# Patient Record
Sex: FEMALE | Race: WHITE
[De-identification: names, ages, dates, MRNs, and addresses within clinical notes are randomized per-mention and may not be internally consistent; named-entity substitution may affect disease eponyms.]

---

## 2019-09-04 LAB
ALBUMIN SERPL-MCNC: 3.8 G/DL (ref 3.5–5)
ALBUMIN/GLOB SERPL: 0.9 {RATIO} (ref 0.8–2)
ALP SERPL-CCNC: 59 IU/L (ref 40–150)
ALT SERPL-CCNC: 16 IU/L (ref 0–55)
ANION GAP SERPL CALC-SCNC: 16.4 MMOL/L (ref 8–16)
BASOPHILS # BLD AUTO: 0.1 10*3/UL (ref 0–0.1)
BASOPHILS NFR BLD AUTO: 0.7 % (ref 0–1)
BUN SERPL-MCNC: 13 MG/DL (ref 7–26)
BUN/CREAT SERPL: 19 (ref 6–25)
CALCIUM SERPL-MCNC: 9.6 MG/DL (ref 8.4–10.2)
CHLORIDE SERPL-SCNC: 100 MMOL/L (ref 98–107)
CO2 SERPL-SCNC: 20 MMOL/L (ref 22–29)
DEPRECATED NEUTROPHILS # BLD AUTO: 5 10*3/UL (ref 2.1–6.9)
EGFRCR SERPLBLD CKD-EPI 2021: > 60 ML/MIN (ref 60–?)
EOSINOPHIL # BLD AUTO: 0.1 10*3/UL (ref 0–0.4)
EOSINOPHIL NFR BLD AUTO: 1.3 % (ref 0–6)
ERYTHROCYTE [DISTWIDTH] IN CORD BLOOD: 14.5 % (ref 11.7–14.4)
GLOBULIN PLAS-MCNC: 4.2 G/DL (ref 2.3–3.5)
GLUCOSE SERPLBLD-MCNC: 81 MG/DL (ref 74–118)
HCT VFR BLD AUTO: 45.3 % (ref 34.2–44.1)
HGB BLD-MCNC: 15.8 G/DL (ref 12–16)
LYMPHOCYTES # BLD: 2.8 10*3/UL (ref 1–3.2)
LYMPHOCYTES NFR BLD AUTO: 32.7 % (ref 18–39.1)
MCH RBC QN AUTO: 34.3 PG (ref 28–32)
MCHC RBC AUTO-ENTMCNC: 34.9 G/DL (ref 31–35)
MCV RBC AUTO: 98.3 FL (ref 81–99)
MONOCYTES # BLD AUTO: 0.7 10*3/UL (ref 0.2–0.8)
MONOCYTES NFR BLD AUTO: 7.5 % (ref 4.4–11.3)
NEUTS SEG NFR BLD AUTO: 57.6 % (ref 38.7–80)
PLATELET # BLD AUTO: 192 X10E3/UL (ref 140–360)
POTASSIUM SERPL-SCNC: 3.4 MMOL/L (ref 3.5–5.1)
RBC # BLD AUTO: 4.61 X10E6/UL (ref 3.6–5.1)
SODIUM SERPL-SCNC: 133 MMOL/L (ref 136–145)

## 2019-09-06 ENCOUNTER — HOSPITAL ENCOUNTER (OUTPATIENT)
Dept: HOSPITAL 88 - CATH LAB | Age: 64
Discharge: HOME | End: 2019-09-06
Attending: INTERNAL MEDICINE
Payer: COMMERCIAL

## 2019-09-06 VITALS — SYSTOLIC BLOOD PRESSURE: 178 MMHG | DIASTOLIC BLOOD PRESSURE: 88 MMHG

## 2019-09-06 VITALS — HEIGHT: 62 IN | BODY MASS INDEX: 23.92 KG/M2 | WEIGHT: 130 LBS

## 2019-09-06 VITALS — SYSTOLIC BLOOD PRESSURE: 170 MMHG | DIASTOLIC BLOOD PRESSURE: 64 MMHG

## 2019-09-06 VITALS — DIASTOLIC BLOOD PRESSURE: 82 MMHG | SYSTOLIC BLOOD PRESSURE: 164 MMHG

## 2019-09-06 VITALS — DIASTOLIC BLOOD PRESSURE: 71 MMHG | SYSTOLIC BLOOD PRESSURE: 176 MMHG

## 2019-09-06 VITALS — DIASTOLIC BLOOD PRESSURE: 78 MMHG | SYSTOLIC BLOOD PRESSURE: 118 MMHG

## 2019-09-06 DIAGNOSIS — F17.210: ICD-10-CM

## 2019-09-06 DIAGNOSIS — I70.212: Primary | ICD-10-CM

## 2019-09-06 DIAGNOSIS — I10: ICD-10-CM

## 2019-09-06 DIAGNOSIS — Z01.812: ICD-10-CM

## 2019-09-06 PROCEDURE — 37229: CPT

## 2019-09-06 PROCEDURE — 85025 COMPLETE CBC W/AUTO DIFF WBC: CPT

## 2019-09-06 PROCEDURE — 75625 CONTRAST EXAM ABDOMINL AORTA: CPT

## 2019-09-06 PROCEDURE — 36247 INS CATH ABD/L-EXT ART 3RD: CPT

## 2019-09-06 PROCEDURE — 37186 SEC ART THROMBECTOMY ADD-ON: CPT

## 2019-09-06 PROCEDURE — 36415 COLL VENOUS BLD VENIPUNCTURE: CPT

## 2019-09-06 PROCEDURE — 75716 ARTERY X-RAYS ARMS/LEGS: CPT

## 2019-09-06 PROCEDURE — 37232: CPT

## 2019-09-06 PROCEDURE — 80053 COMPREHEN METABOLIC PANEL: CPT

## 2019-09-06 PROCEDURE — 37227: CPT

## 2019-09-06 PROCEDURE — 37252 INTRVASC US NONCORONARY 1ST: CPT

## 2019-09-06 NOTE — NUR
1745 pt becoming increasingly eager for rapid discharge. Cath lab supervisor Althea Bucio RN 
at bedside and pt prepped for dc. Yue arrived in [facility] pt refused w/c escort to front 
[facility]. No gross issues of pain,pallor,pressure or dysrhythmia. Rt Mynx closure site intact 
w/o noted hematoma or oozing.Pt was given copies of papers and home prescriptions and meds. 
She was explained meds and importance of f/o care with Dr Pascal 2wks.Then, patient was 
escorted to [facility] ambulatory. Iv was removed and no s/s infiltration Coban dressing in 
place. Pt back to apparent baseline orientation .Copies of dc paper in belonging bag. belinda/rn

## 2019-09-06 NOTE — NUR
4622 Bedside report received from Michael JOYNER. Alert oriented and inappropriate and agitative 
periodically, PERRLA, respirations even and unlabored to room air. Pulses x2 upper 
extremity.  Pedal pulses PT/DP palpable . Cap fill brisk < 3 sec. 



Skin warm and dry integrity appears D/I. IV 20g to left hand,Infusing at 100cchr, presents 
healthy w/o s/s of infiltration or complaint. Abdomen soft and supple. pt offered toileting, 
denies need to urinate or defecate. No personal affects with patient.  no Family. Pt and 
family verbalizes agitation of having to stay flat and down till 6pm. Pt voices no release 
of information and ride [facility] Clark will pickup with no information release. 
Reached out per phone call to [phone] by Michael JOYNER sedation nurse and will arrive at 6pm.



Currently w/o complaint of pain or need. Rt Mynx closure site w/o s/s hematoma no gross 
issues pain, pallor pressure or dysrhythmia.  Attempted instruction of home care to pt since 
refused info to ride. Pt refused to completely fill out discharge papers due to apparent 
agitative state. MD and supervision staff aware. belinda/rn

## 2019-09-10 NOTE — OPERATIVE REPORT
DATE OF PROCEDURE:  09/06/2019

 

SURGEON:  Feliz Pascal MD

 

INDICATIONS:  Peripheral arterial disease, claudication of the left lower extremity.

 

PROCEDURES PERFORMED:  

1. Abdominal aortogram.

2. Selective catheter placement of the right femoral artery to the left superficial

femoral artery. 

3. Additional third-order catheter placement of the right femoral artery to the left

anterior tibial artery. 

4. Atherectomy, angioplasty of the left femoral artery.

5. Secondary thrombectomy of the left femoral artery.

6. Stent placement to the left femoral artery.

7. Atherectomy and angioplasty of the left anterior tibial artery.

8. Atherectomy and angioplasty of left peroneal artery.

9. Deployment of right groin Mynx closure device.

 

COMPLICATIONS:  None.

 

RECOMMENDATIONS:  Staged left posterior tibial intervention via left renal artery.

 

DESCRIPTION OF PROCEDURE:  Access obtained in the right femoral artery.  Abdominal

aortogram demonstrated mild disease in the abdominal aorta and iliacs bilaterally.  The

femoral artery appeared to be occluded and was not well visualized.  The catheter then

advanced in the right femoral artery, left superficial femoral artery with complete

occlusion.  The left femoral artery was noted, right femoral artery diffuse 50%

stenosis.  Infrapopliteal vessels were not well visualized. 

 

The catheter was then advanced in the right femoral artery, right anterior tibial artery

with diffuse 90% stenosis.  The left anterior tibial artery 90% stenosis of the

tibioperoneal trunk as well as complete occlusion of the left posterior tibial artery

was noted. 

 

A decision was made to intervene on the left anterior tibial, peroneal and femoral

artery.  The patient received heparin for anticoagulation.  The sheath was exchanged to

a 6-Thai 45 cm sheath.  The lesions were crossed using a Glidewire.  Orbital

atherectomy of the left posterior tibial artery as well as peroneal arteries were

performed, postdilatation with 4 mm and 3 mm balloon respectively.  Orbital atherectomy

of the left femoral artery was then performed with large amounts of visible thrombus for

which manual aspiration thrombectomy was needed.  A single stent 6 x 80 mm deployed,

post dilated with a 6 mm drug-coated balloon with excellent end result, two vessel

runoff to the left foot.  Right groin repaired using Mynx closure device.  The patient

was discharged home same day. 

 

 

 

 

______________________________

MD MAGY Haas/MODL

D:  09/10/2019 13:19:27

T:  09/10/2019 13:46:09

Job #:  [id number]

## 2019-11-11 LAB
ALBUMIN SERPL-MCNC: 3.8 G/DL (ref 3.5–5)
ALBUMIN/GLOB SERPL: 0.8 {RATIO} (ref 0.8–2)
ALP SERPL-CCNC: 85 IU/L (ref 40–150)
ALT SERPL-CCNC: 22 IU/L (ref 0–55)
ANION GAP SERPL CALC-SCNC: 12.6 MMOL/L (ref 8–16)
BASOPHILS # BLD AUTO: 0.1 10*3/UL (ref 0–0.1)
BASOPHILS NFR BLD AUTO: 0.5 % (ref 0–1)
BUN SERPL-MCNC: 11 MG/DL (ref 7–26)
BUN/CREAT SERPL: 17 (ref 6–25)
CALCIUM SERPL-MCNC: 9.5 MG/DL (ref 8.4–10.2)
CHLORIDE SERPL-SCNC: 101 MMOL/L (ref 98–107)
CO2 SERPL-SCNC: 27 MMOL/L (ref 22–29)
DEPRECATED NEUTROPHILS # BLD AUTO: 6.4 10*3/UL (ref 2.1–6.9)
EGFRCR SERPLBLD CKD-EPI 2021: > 60 ML/MIN (ref 60–?)
EOSINOPHIL # BLD AUTO: 0.1 10*3/UL (ref 0–0.4)
EOSINOPHIL NFR BLD AUTO: 1.1 % (ref 0–6)
ERYTHROCYTE [DISTWIDTH] IN CORD BLOOD: 13.5 % (ref 11.7–14.4)
GLOBULIN PLAS-MCNC: 4.6 G/DL (ref 2.3–3.5)
GLUCOSE SERPLBLD-MCNC: 91 MG/DL (ref 74–118)
HCT VFR BLD AUTO: 43.6 % (ref 34.2–44.1)
HGB BLD-MCNC: 14.9 G/DL (ref 12–16)
LYMPHOCYTES # BLD: 2.4 10*3/UL (ref 1–3.2)
LYMPHOCYTES NFR BLD AUTO: 25.4 % (ref 18–39.1)
MCH RBC QN AUTO: 34.1 PG (ref 28–32)
MCHC RBC AUTO-ENTMCNC: 34.2 G/DL (ref 31–35)
MCV RBC AUTO: 99.8 FL (ref 81–99)
MONOCYTES # BLD AUTO: 0.5 10*3/UL (ref 0.2–0.8)
MONOCYTES NFR BLD AUTO: 5.3 % (ref 4.4–11.3)
NEUTS SEG NFR BLD AUTO: 67.4 % (ref 38.7–80)
PLATELET # BLD AUTO: 243 X10E3/UL (ref 140–360)
POTASSIUM SERPL-SCNC: 3.6 MMOL/L (ref 3.5–5.1)
RBC # BLD AUTO: 4.37 X10E6/UL (ref 3.6–5.1)
SODIUM SERPL-SCNC: 137 MMOL/L (ref 136–145)

## 2019-11-12 ENCOUNTER — HOSPITAL ENCOUNTER (OUTPATIENT)
Dept: HOSPITAL 88 - CATH LAB | Age: 64
Discharge: HOME | End: 2019-11-12
Attending: INTERNAL MEDICINE
Payer: MEDICARE

## 2019-11-12 VITALS — SYSTOLIC BLOOD PRESSURE: 117 MMHG | DIASTOLIC BLOOD PRESSURE: 80 MMHG

## 2019-11-12 VITALS — DIASTOLIC BLOOD PRESSURE: 87 MMHG | SYSTOLIC BLOOD PRESSURE: 108 MMHG

## 2019-11-12 VITALS — SYSTOLIC BLOOD PRESSURE: 141 MMHG | DIASTOLIC BLOOD PRESSURE: 80 MMHG

## 2019-11-12 VITALS — DIASTOLIC BLOOD PRESSURE: 72 MMHG | SYSTOLIC BLOOD PRESSURE: 124 MMHG

## 2019-11-12 VITALS — DIASTOLIC BLOOD PRESSURE: 77 MMHG | SYSTOLIC BLOOD PRESSURE: 127 MMHG

## 2019-11-12 VITALS — SYSTOLIC BLOOD PRESSURE: 117 MMHG | DIASTOLIC BLOOD PRESSURE: 82 MMHG

## 2019-11-12 VITALS — SYSTOLIC BLOOD PRESSURE: 99 MMHG | DIASTOLIC BLOOD PRESSURE: 66 MMHG

## 2019-11-12 VITALS — SYSTOLIC BLOOD PRESSURE: 93 MMHG | DIASTOLIC BLOOD PRESSURE: 60 MMHG

## 2019-11-12 VITALS — DIASTOLIC BLOOD PRESSURE: 77 MMHG | SYSTOLIC BLOOD PRESSURE: 117 MMHG

## 2019-11-12 VITALS — BODY MASS INDEX: 24.11 KG/M2 | HEIGHT: 62 IN | WEIGHT: 131 LBS

## 2019-11-12 DIAGNOSIS — I70.249: Primary | ICD-10-CM

## 2019-11-12 DIAGNOSIS — Z01.812: ICD-10-CM

## 2019-11-12 DIAGNOSIS — F17.200: ICD-10-CM

## 2019-11-12 PROCEDURE — 76937 US GUIDE VASCULAR ACCESS: CPT

## 2019-11-12 PROCEDURE — 36247 INS CATH ABD/L-EXT ART 3RD: CPT

## 2019-11-12 PROCEDURE — 37186 SEC ART THROMBECTOMY ADD-ON: CPT

## 2019-11-12 PROCEDURE — 99153 MOD SED SAME PHYS/QHP EA: CPT

## 2019-11-12 PROCEDURE — 85025 COMPLETE CBC W/AUTO DIFF WBC: CPT

## 2019-11-12 PROCEDURE — 75710 ARTERY X-RAYS ARM/LEG: CPT

## 2019-11-12 PROCEDURE — 36415 COLL VENOUS BLD VENIPUNCTURE: CPT

## 2019-11-12 PROCEDURE — 80053 COMPREHEN METABOLIC PANEL: CPT

## 2019-11-12 PROCEDURE — 37229: CPT

## 2019-11-12 PROCEDURE — 99152 MOD SED SAME PHYS/QHP 5/>YRS: CPT

## 2019-11-12 NOTE — OPERATIVE REPORT
DATE OF PROCEDURE:  11/12/2019

 

SURGEON:  Feliz Pascal MD

 

INDICATIONS:  Peripheral arterial disease staged intervention on the left posterior

tibial artery. 

 

COMPLICATIONS:  None.

 

PROCEDURES PERFORMED:  

1. Ultrasound-guided access in the left posterior tibial artery with unilateral

extremity angiogram and third-order catheter placement from the left pedal artery to the

left superficial femoral artery. 

2. Atherectomy and angioplasty of left posterior tibial artery.

3. Secondary thrombectomy of left posterior tibial artery.

4. Deployment left ankle TR band.

 

RECOMMENDATIONS:  Dual antiplatelet therapy.

 

DESCRIPTION OF PROCEDURE:  Ultrasound-guided access obtained in the left posterior

tibial artery.  A 6-Somali sheath was placed.  Intravenous heparin was administered for

anticoagulation.  The lesion was crossed using a ViperWire.  Orbital atherectomy using a

Diamondback CSI device was performed.  Large amounts of visible thrombus for which

manual aspiration thrombectomy were needed.  A 2.5 mm balloon was used for angioplasty.

Excellent 

end result.  Three-vessel runoff to the left foot.  No complications.  Right ankle TR

band applied.  Sheath removed.  The patient discharged home the same day. 

 

 

 

 

______________________________

Feliz Pascal MD

 

KSB/MODL

D:  11/12/2019 11:08:09

T:  11/12/2019 13:04:19

Job #:  [id number]

## 2019-11-12 NOTE — NUR
1050 Bedside report received from Michael JOYNER. Alert oriented and appropriate, PERRLA, 
respirations even and unlabored to room air. Pulses x4 extremities equal and strong. Pedal 
pulses PT/DP X4 and marked. Cap fill brisk < 3 sec. 



Skin warm and dry integrity appears D/I IV 20g to left hand,  presents healthy w/o s/s of 
infiltration or complaint. Abdomen soft and supple. pt offered toileting, denies need to 
urinate or defecate. No personal affects with patient.  Family XXXXX. Pt and family 
verbalizes understanding of POC.



Currently w/o complaint of pain or need. Ok to decrease TR band at 1230 and dc home after 
that.ds/rn

## 2019-11-12 NOTE — NUR
1315t meets DC criteria. left pedal TR band site assessed for s/s of complication and 
presence of hematoma. XXXXX warm, dry, no discolor, and pulses present. IV removed from left 
hand. Distal tip appears intact. VS WNL. Pt denies pain, sob, or need at this time. No 
Family, friend arrived. Review of discharge paperwork and follow up instructions. 
verbalized understanding. Pt to wheelchair and transported to front of hospital. Transferred 
to private vehicle under own strength w/o incident with DC paperwork in hand. - ds/rn

## 2019-11-12 NOTE — NUR
1230

-------------------------------------------------------------------------------

Addendum: 11/12/19 at 2041 by Jojo Green RN

-------------------------------------------------------------------------------

Pedal left Compression removal:     Initial  Cuff  volume  14    cc

        1230 -2cc  Removed  No hematoma/bleeding noted with normal  neurovascular function.



         1245  -5 cc Removed  No hematoma/ bleeding noted with normal  neurovascular 
function.



         1300  -5cc Removed  No hematoma/bleeding noted with normal  neurovascular function.



              cc Removed  No hematoma/ bleeding noted with normal  neurovascular function.



Air removal completed. 

Stasis achieved sterile 2x2,Tegaderm,  Coban  dressing  No hematoma, bleeding noted with 
normal neurovascular function. Wrist splint in place. Pt instructed on POC.

Ds/Rn

## 2019-11-12 NOTE — XMS REPORT
Patient Summary Document

                             Created on: 2019



Tiny Hernandez

External Reference #: [id number]

: 1955

Sex: Female



Demographics







                          [place]  [address]

 

                          Home Phone                [phone]

 

                          Preferred Language        Unknown

 

                          Marital Status            Unknown

 

                          Restorationist Affiliation     Unknown

 

                          Race                      Unknown

 

                                        Additional Race(s)  

 

                          Ethnic Group              Unknown





Author







                          Author                    [facility]

 

                          Address                   Unknown

 

                          Phone                     Unavailable







Care Team Providers







                    Care Team Member Name    Role                Phone

 

                          Unavailable               Unavailable







Problems

This patient has no known problems.



Allergies, Adverse Reactions, Alerts

This patient has no known allergies or adverse reactions.



Medications

This patient has no known medications.



Encounters







             Start Date/Time    End Date/Time    Encounter Type    Admission Type    Attending [facility]       Care Department     Encounter ID

 

        2018 00:00:00    2018 00:00:00    Outpatient                    [facility]     [id number]

 

        2018 00:00:00    2018 00:00:00    Outpatient                    [facility]     [id number]

 

        2017 00:00:00    2017 00:00:00    Outpatient                    [facility]     [id number]

 

        2017-10-31 00:00:00    2017-10-31 00:00:00    Outpatient                    [facility]     [id number]

 

        2017-10-02 00:00:00    2017-10-02 00:00:00    Outpatient                    [facility]     [id number]

 

        2017-09-15 00:00:00    2017-09-15 00:00:00    Outpatient                    [facility]     [id number]

 

        2017 21:25:29    2017 21:25:29    Emergency                    [facility]     [id number]

 

        2017 19:53:30    2017 19:53:30    Emergency                    [facility]     [id number]